# Patient Record
Sex: MALE | Race: WHITE | Employment: OTHER | URBAN - METROPOLITAN AREA
[De-identification: names, ages, dates, MRNs, and addresses within clinical notes are randomized per-mention and may not be internally consistent; named-entity substitution may affect disease eponyms.]

---

## 2021-07-16 ENCOUNTER — HOSPITAL ENCOUNTER (EMERGENCY)
Age: 41
Discharge: HOME | End: 2021-07-16
Payer: MEDICAID

## 2021-07-16 VITALS
HEART RATE: 78 BPM | WEIGHT: 255 LBS | HEIGHT: 75 IN | OXYGEN SATURATION: 98 % | RESPIRATION RATE: 16 BRPM | DIASTOLIC BLOOD PRESSURE: 96 MMHG | BODY MASS INDEX: 31.71 KG/M2 | SYSTOLIC BLOOD PRESSURE: 124 MMHG | TEMPERATURE: 98.7 F

## 2021-07-16 DIAGNOSIS — L72.3 INFECTED SEBACEOUS CYST OF SKIN: Primary | ICD-10-CM

## 2021-07-16 DIAGNOSIS — L08.9 INFECTED SEBACEOUS CYST OF SKIN: Primary | ICD-10-CM

## 2021-07-16 PROCEDURE — 6370000000 HC RX 637 (ALT 250 FOR IP): Performed by: NURSE PRACTITIONER

## 2021-07-16 PROCEDURE — 99283 EMERGENCY DEPT VISIT LOW MDM: CPT

## 2021-07-16 RX ORDER — DOXYCYCLINE HYCLATE 100 MG
100 TABLET ORAL 2 TIMES DAILY
Qty: 20 TABLET | Refills: 0 | Status: SHIPPED | OUTPATIENT
Start: 2021-07-16 | End: 2021-07-26

## 2021-07-16 RX ORDER — OMEPRAZOLE 40 MG/1
40 CAPSULE, DELAYED RELEASE ORAL DAILY
COMMUNITY

## 2021-07-16 RX ORDER — ATORVASTATIN CALCIUM 10 MG/1
10 TABLET, FILM COATED ORAL DAILY
COMMUNITY

## 2021-07-16 RX ORDER — MONTELUKAST SODIUM 10 MG/1
10 TABLET ORAL NIGHTLY
COMMUNITY

## 2021-07-16 RX ORDER — IBUPROFEN 800 MG/1
800 TABLET ORAL EVERY 6 HOURS PRN
Qty: 20 TABLET | Refills: 0 | Status: SHIPPED | OUTPATIENT
Start: 2021-07-16 | End: 2021-10-28

## 2021-07-16 RX ORDER — LIDOCAINE HYDROCHLORIDE 10 MG/ML
20 INJECTION, SOLUTION INFILTRATION; PERINEURAL ONCE
Status: DISCONTINUED | OUTPATIENT
Start: 2021-07-16 | End: 2021-07-16 | Stop reason: HOSPADM

## 2021-07-16 RX ORDER — DOXYCYCLINE HYCLATE 100 MG/1
100 CAPSULE ORAL ONCE
Status: COMPLETED | OUTPATIENT
Start: 2021-07-16 | End: 2021-07-16

## 2021-07-16 RX ADMIN — DOXYCYCLINE HYCLATE 100 MG: 100 CAPSULE ORAL at 15:16

## 2021-07-16 ASSESSMENT — PAIN SCALES - GENERAL: PAINLEVEL_OUTOF10: 5

## 2021-07-16 ASSESSMENT — PAIN DESCRIPTION - FREQUENCY: FREQUENCY: CONTINUOUS

## 2021-07-16 ASSESSMENT — PAIN DESCRIPTION - LOCATION: LOCATION: BACK

## 2021-07-16 ASSESSMENT — PAIN DESCRIPTION - DESCRIPTORS: DESCRIPTORS: SHARP

## 2021-07-16 NOTE — ED PROVIDER NOTES
ATTENDING PROVIDER ATTESTATION:     Supervising Physician, on-site, available for consultation, non-participatory in the evaluation or care of this patient     [facility]  Department of Emergency Medicine   ED  Encounter Note  Admit Date/RoomTime: 2021  2:31 PM  ED Room:     NAME: Gerardo Hammer  : 1980  MRN: [id number]     Chief Complaint:  Abscess (to back-  girlfriend drained it at home-yesterday)    History of Present Illness        Gerardo Hammer is a 41 y.o. old male who presents to the emergency department by private vehicle, for gradual onset tender area on right posterior thorax and states he has had a bump on the back for 10 or 15 years and last night his girlfriend tried to pop it and pus came out and he has surrounding erythema. He states his tetanus was updated 3 years ago. Since onset the symptoms have been persistent and gradually worsening, associated with surrounding erythema and tenderness and mild-moderate in severity. He has a history of no prior episodes. ROS   Pertinent positives and negatives are stated within HPI, all other systems reviewed and are negative. Past Medical History:  has a past medical history of Anxiety and depression, GERD (gastroesophageal reflux disease), Hyperlipidemia, and Prediabetes. Surgical History:  has no past surgical history on file. Social History:  reports that he has never smoked. He has never used smokeless tobacco. He reports that he does not drink alcohol. Family History: family history is not on file. Allergies: Pcn [penicillins]    Physical Exam   Oxygen Saturation Interpretation: Normal.        ED Triage Vitals [21 1422]   BP Temp Temp src Pulse Resp SpO2 Height Weight   (!) 124/96 98.7 °F (37.1 °C) -- 78 16 98 % 6' 3\" (1.905 m) 255 lb (115.7 kg)         Constitutional:  Alert, development consistent with age. HEENT:  NC/NT. Airway patent  Neck:  Normal ROM. Supple. Respiratory:  Clear to auscultation and breath sounds equal.  CV:  Regular rate and rhythm, normal heart sounds, without pathological murmurs, ectopy, gallops, or rubs. GI:  Abdomen Soft, nontender, good bowel sounds. No firm or pulsatile mass. Back:  No costovertebral tenderness. Extremities: No tenderness or edema noted. Integument:  Normal turgor. Warm, dry. See photo below  Lymphatics: No lymphangitis or adenopathy noted. Neurological:  Oriented. Motor functions intact. **Informed Consent**    The patient has given verbal consent to have photos taken of thorax and inserted into their ED Provider Note as part of their permanent medical record for purposes of documentation, treatment management and/or medical review. All Images taken on 7/16/21 of patient name: Cathi Meter were transmitted and stored on secured 3Nod located within [employer] Tab by a registered [employer] Mobile Application Device. 5 cm x 10 cm erythema with fluctuance centrally    Lab / Imaging Results   (All laboratory and radiology results have been personally reviewed by myself)  Labs:  No results found for this visit on 07/16/21. Imaging: All Radiology results interpreted by Radiologist unless otherwise noted. No orders to display       ED Course / Medical Decision Making     Medications   lidocaine 1 % injection 20 mL (has no administration in time range)   doxycycline hyclate (VIBRAMYCIN) capsule 100 mg (100 mg Oral Given 7/16/21 1516)        Consult(s):   None    Procedure(s):  PROCEDURE  7/16/21       Time: 1540    INCISION AND DRAINAGE  Risks, benefits and alternatives (for applicable procedures below) described. Performed By: MUSA Jacobs CNP. Indication: Abscess located on right posterior thorax.   Informed consent obtained: The patient was counseled regarding the procedure, it's indications, risks, potential complications and alternatives and any questions were answered. Consent was obtained. .  Prep: The skin was irrigated with sterile saline, cleansed with povidone iodine and draped in a sterile fashion. Local Anesthesia:  obtained with Lidocaine 1% without epinephrine. Incision: The infected epidermal inclusion cyst was Incised by scalpal and moderate, thick sebaceous gray, purulent fluid was drained. A wound culture was not obtained. The wound  was irrigated and was packed with iodoform gauze. The wound was then covered with a sterile dressing. Patient tolerated the procedure well. MDM:      Patient presents today with an infected sebaceous cyst which was successfully drained and wound packing placed. Patient advised to remove the wound packing within the next 48 hours do not leave it any longer. He will be given a surgical consult for reevaluation. Patient was started on doxycycline in the ED and advised to avoid any sun exposure while taking antibiotic. Plan is for treatment with analgesics, ATB's and appropriate outpatient follow-up. Patient is urged to take all ATB to completion unless and adverse reaction develops. Patient advised on signs and symptoms warranting immediate return to the ED for reevaluation and follow-up in the next few days. .    Plan of Care/Counseling:  MUSA Baker CNP reviewed today's visit with the patient in addition to providing specific details for the plan of care and counseling regarding the diagnosis and prognosis. Questions are answered at this time and are agreeable with the plan. Assessment      1.  Infected sebaceous cyst of skin      Plan   Discharged home  Patient condition is good    New Medications     New Prescriptions    DOXYCYCLINE HYCLATE (VIBRA-TABS) 100 MG TABLET    Take 1 tablet by mouth 2 times daily for 10 days    IBUPROFEN (ADVIL;MOTRIN) 800 MG TABLET    Take 1 tablet by mouth every 6 hours as needed for Pain     Electronically signed by MUSA Baker CNP   DD: 7/16/21  **This report was transcribed using voice recognition software. Every effort was made to ensure accuracy; however, inadvertent computerized transcription errors may be present.   END OF ED PROVIDER NOTE      MUSA Moreno - CNP  07/18/21 0871       Tran Solorzano MD  07/20/21 5796

## 2021-07-19 ENCOUNTER — TELEPHONE (OUTPATIENT)
Dept: SURGERY | Age: 41
End: 2021-07-19

## 2021-07-19 NOTE — TELEPHONE ENCOUNTER
GRACE Cunningham received a call from patient wanting to schedule an appointment. MA scheduled pt for 7/29/21 @ 3:30pm with  in [facility] clinic. Patient verbalized appointment date, time and location. MA verified number that was good to do reminder call on was the one listed in chart. MA advised patient where to park and enter in the building for the appointment.     Electronically signed by Darleen Rocha MA on 7/19/21 at 1:57 PM EDT

## 2021-07-29 ENCOUNTER — PREP FOR PROCEDURE (OUTPATIENT)
Dept: SURGERY | Age: 41
End: 2021-07-29

## 2021-07-29 ENCOUNTER — OFFICE VISIT (OUTPATIENT)
Dept: SURGERY | Age: 41
End: 2021-07-29
Payer: MEDICAID

## 2021-07-29 VITALS
TEMPERATURE: 97.2 F | DIASTOLIC BLOOD PRESSURE: 86 MMHG | BODY MASS INDEX: 33.07 KG/M2 | RESPIRATION RATE: 16 BRPM | WEIGHT: 266 LBS | HEART RATE: 72 BPM | OXYGEN SATURATION: 95 % | HEIGHT: 75 IN | SYSTOLIC BLOOD PRESSURE: 129 MMHG

## 2021-07-29 DIAGNOSIS — K21.9 GASTROESOPHAGEAL REFLUX DISEASE, UNSPECIFIED WHETHER ESOPHAGITIS PRESENT: ICD-10-CM

## 2021-07-29 DIAGNOSIS — L72.0 INFECTED EPIDERMOID CYST: Primary | ICD-10-CM

## 2021-07-29 DIAGNOSIS — F32.A ANXIETY AND DEPRESSION: ICD-10-CM

## 2021-07-29 DIAGNOSIS — L08.9 INFECTED EPIDERMOID CYST: Primary | ICD-10-CM

## 2021-07-29 DIAGNOSIS — E11.9 DIABETES MELLITUS TYPE 2, NONINSULIN DEPENDENT (HCC): ICD-10-CM

## 2021-07-29 DIAGNOSIS — F41.9 ANXIETY AND DEPRESSION: ICD-10-CM

## 2021-07-29 DIAGNOSIS — E78.5 HYPERLIPIDEMIA, UNSPECIFIED HYPERLIPIDEMIA TYPE: ICD-10-CM

## 2021-07-29 PROCEDURE — G8417 CALC BMI ABV UP PARAM F/U: HCPCS | Performed by: SURGERY

## 2021-07-29 PROCEDURE — 3046F HEMOGLOBIN A1C LEVEL >9.0%: CPT | Performed by: SURGERY

## 2021-07-29 PROCEDURE — 99203 OFFICE O/P NEW LOW 30 MIN: CPT | Performed by: SURGERY

## 2021-07-29 PROCEDURE — 2022F DILAT RTA XM EVC RTNOPTHY: CPT | Performed by: SURGERY

## 2021-07-29 PROCEDURE — 1036F TOBACCO NON-USER: CPT | Performed by: SURGERY

## 2021-07-29 PROCEDURE — G8427 DOCREV CUR MEDS BY ELIG CLIN: HCPCS | Performed by: SURGERY

## 2021-07-29 RX ORDER — DEXAMETHASONE SODIUM PHOSPHATE 4 MG/ML
8 INJECTION, SOLUTION INTRA-ARTICULAR; INTRALESIONAL; INTRAMUSCULAR; INTRAVENOUS; SOFT TISSUE ONCE
Status: CANCELLED | OUTPATIENT
Start: 2021-07-29 | End: 2021-07-29

## 2021-07-29 RX ORDER — SODIUM CHLORIDE 9 MG/ML
25 INJECTION, SOLUTION INTRAVENOUS PRN
Status: CANCELLED | OUTPATIENT
Start: 2021-07-29

## 2021-07-29 RX ORDER — KETOROLAC TROMETHAMINE 30 MG/ML
30 INJECTION, SOLUTION INTRAMUSCULAR; INTRAVENOUS ONCE
Status: CANCELLED | OUTPATIENT
Start: 2021-07-29 | End: 2021-07-29

## 2021-07-29 RX ORDER — SODIUM CHLORIDE 0.9 % (FLUSH) 0.9 %
10 SYRINGE (ML) INJECTION EVERY 12 HOURS SCHEDULED
Status: CANCELLED | OUTPATIENT
Start: 2021-07-29

## 2021-07-29 RX ORDER — SODIUM CHLORIDE 0.9 % (FLUSH) 0.9 %
10 SYRINGE (ML) INJECTION PRN
Status: CANCELLED | OUTPATIENT
Start: 2021-07-29

## 2021-07-29 RX ORDER — ACETAMINOPHEN 500 MG
1000 TABLET ORAL ONCE
Status: CANCELLED | OUTPATIENT
Start: 2021-07-29 | End: 2021-07-29

## 2021-07-29 RX ORDER — SODIUM CHLORIDE 9 MG/ML
INJECTION, SOLUTION INTRAVENOUS CONTINUOUS
Status: CANCELLED | OUTPATIENT
Start: 2021-07-29

## 2021-07-29 NOTE — LETTER
[address]  [address], [place], [address]  [phone] (Fax)    July 29, 2021     Caitlin Yun MD  [address] [address]    Patient: Garcia Tucker   YOB: 1980   Date of Visit: 7/29/2021       Dear Caitlin Yun:    Thank you for referring Garcia Tucker to me for evaluation. Attached is my office note. If you have questions, please do not hesitate to call me. I look forward to following this patient along with you. Sincerely,    Electronically signed by Maxx Pierce MD on 7/29/21 at 4:38 PM EDT                                                    History and Physical    Patient's Name/Date of Birth: Garcia Tucker /1980, (41 y.o.), male    Date: July 29, 2021     Assessment/Plan:    Chief Complaint:  Chief Complaint   Patient presents with    Cyst     pt states that he has a cyst/abscess on his back that he went to the ED for on 7/16/21 where they did an I&D, pt finished his antibiotics on monday. pt states that he still has brown drainage coming from wound pt states that he has very little pain. pt denies any fever/chills/nausea or vomiting. HPI:   Cysts right mid back x 15 years was going to remove last year but postopone due to covid      1-2 days pain swelling rednnes wtne tot   7/16/2021 [place] ED I&D rmoved packing after 2 days Doxycylin x 10 days 100 mg bid    No other cysts     Past Medical History:   Diagnosis Date    Anxiety and depression     GERD (gastroesophageal reflux disease)     Hyperlipidemia     Prediabetes        History reviewed. No pertinent surgical history.     Current Outpatient Medications   Medication Sig Dispense Refill    TRAZODONE HCL PO Take 50 mg by mouth       METOPROLOL SUCCINATE PO Take by mouth      SERTRALINE HCL PO Take 150 mg by mouth       METFORMIN HCL PO Take by mouth      montelukast (SINGULAIR) 10 MG tablet Take 10 mg by mouth nightly      atorvastatin (LIPITOR) 10 MG tablet Take 10 mg by mouth daily      omeprazole (PRILOSEC) 40 MG delayed release capsule Take 40 mg by mouth daily      ibuprofen (ADVIL;MOTRIN) 800 MG tablet Take 1 tablet by mouth every 6 hours as needed for Pain 20 tablet 0     No current facility-administered medications for this visit. Allergies   Allergen Reactions    Pcn [Penicillins] Swelling       Review of Systems  Non-contributory    Physical Exam:  Vitals:    07/29/21 1526   BP: 129/86   Site: Right Upper Arm   Position: Sitting   Cuff Size: Large Adult   Pulse: 72   Resp: 16   Temp: 97.2 °F (36.2 °C)   TempSrc: Infrared   SpO2: 95%   Weight: 266 lb (120.7 kg)   Height: 6' 3\" (1.905 m)       Body mass index is 33.25 kg/m². Physical Exam  Constitutional:       Appearance: Normal appearance. He is obese. HENT:      Head: Normocephalic and atraumatic. Cardiovascular:      Rate and Rhythm: Normal rate and regular rhythm. Pulmonary:      Effort: Pulmonary effort is normal.      Breath sounds: Normal breath sounds. Skin:     Comments: 2 x 1 cm epidermal cyst right mid back with small I&D opening over it with no drainag (see picture below)   Neurological:      Mental Status: He is alert.                Electronically signed by Kenn Bourne MD on 7/29/21 at 4:07 PM EDT

## 2021-07-29 NOTE — H&P (VIEW-ONLY)
History and Physical    Patient's Name/Date of Birth: Flaco Abreu /1980, (41 y.o.), male    Date: July 29, 2021     Assessment/Plan:  1. 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 Patient understood and wanted to proceed with the procedure. 2. Type II, non-insulin-dependent, diabetes mellitus without complications  3. Hyperlipidemia - on medications for this  4. GERD - on Prilosec 40 mg p.o. daily for this  5. Anxiety and depression - on medications for this    Chief Complaint:  Chief Complaint   Patient presents with    Cyst     pt states that he has a cyst/abscess on his back that he went to the ED for on 7/16/21 where they did an I&D, pt finished his antibiotics on monday. pt states that he still has brown drainage coming from wound pt states that he has very little pain. pt denies any fever/chills/nausea or vomiting. HPI:   Patient was seen in the office today for the above complaint. He said he has had a cyst in the right mid back for the last 15 years. It had not been causing problems but he did plan on having this removed last year but due to the COVID-19 pandemic this was postponed. He was seen in the [facility] ED on 7/16/2021 with a 1 to 2-day history of progressive pain swelling and redness around the cyst.  He had an incision and drainage performed in the ED. The wound was packed. Patient was discharged on doxycycline 100 mg p.o. twice daily x 10 days. He removed the packing 2 days later and has been doing daily change of the bandage over the I&D site. He never had any fever chills or any systemic signs of infection. With the above treatment infection has been markedly improved.     Currently patient has no pain or tenderness over the area of the I&D site of the cyst.  He denies any other cysts in other parts of the body. Past Medical History:   Diagnosis Date    Anxiety and depression     GERD (gastroesophageal reflux disease)     Hyperlipidemia     Prediabetes        History reviewed. No pertinent surgical history. Current Outpatient Medications   Medication Sig Dispense Refill    TRAZODONE HCL PO Take 50 mg by mouth       METOPROLOL SUCCINATE PO Take by mouth      SERTRALINE HCL PO Take 150 mg by mouth       METFORMIN HCL PO Take by mouth      montelukast (SINGULAIR) 10 MG tablet Take 10 mg by mouth nightly      atorvastatin (LIPITOR) 10 MG tablet Take 10 mg by mouth daily      omeprazole (PRILOSEC) 40 MG delayed release capsule Take 40 mg by mouth daily      ibuprofen (ADVIL;MOTRIN) 800 MG tablet Take 1 tablet by mouth every 6 hours as needed for Pain 20 tablet 0     No current facility-administered medications for this visit. Allergies   Allergen Reactions    Pcn [Penicillins] Swelling       Review of Systems  Non-contributory    Physical Exam:  Vitals:    07/29/21 1526   BP: 129/86   Site: Right Upper Arm   Position: Sitting   Cuff Size: Large Adult   Pulse: 72   Resp: 16   Temp: 97.2 °F (36.2 °C)   TempSrc: Infrared   SpO2: 95%   Weight: 266 lb (120.7 kg)   Height: 6' 3\" (1.905 m)       Body mass index is 33.25 kg/m². Physical Exam  Constitutional:       Appearance: Normal appearance. He is obese. HENT:      Head: Normocephalic and atraumatic. Cardiovascular:      Rate and Rhythm: Normal rate and regular rhythm. Pulmonary:      Effort: Pulmonary effort is normal.      Breath sounds: Normal breath sounds. Skin:     Comments: 2 x 1 cm epidermal cyst right mid back with small I&D opening over it with no drainag (see picture below)   Neurological:      Mental Status: He is alert.          Electronically signed by Jolanta Alcantar MD on 7/29/21 at 4:07 PM EDT

## 2021-07-29 NOTE — H&P
History and Physical    Patient's Name/Date of Birth: Jonh Stanton /1980, (41 y.o.), male    Date: July 29, 2021     Assessment/Plan:  1. 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 Patient understood and wanted to proceed with the procedure. 2. Type II, non-insulin-dependent, diabetes mellitus without complications  3. Hyperlipidemia - on medications for this  4. GERD - on Prilosec 40 mg p.o. daily for this  5. Anxiety and depression - on medications for this    Chief Complaint:  Chief Complaint   Patient presents with    Cyst     pt states that he has a cyst/abscess on his back that he went to the ED for on 7/16/21 where they did an I&D, pt finished his antibiotics on monday. pt states that he still has brown drainage coming from wound pt states that he has very little pain. pt denies any fever/chills/nausea or vomiting. HPI:   Patient was seen in the office today for the above complaint. He said he has had a cyst in the right mid back for the last 15 years. It had not been causing problems but he did plan on having this removed last year but due to the COVID-19 pandemic this was postponed. He was seen in the [facility] ED on 7/16/2021 with a 1 to 2-day history of progressive pain swelling and redness around the cyst.  He had an incision and drainage performed in the ED. The wound was packed. Patient was discharged on doxycycline 100 mg p.o. twice daily x 10 days. He removed the packing 2 days later and has been doing daily change of the bandage over the I&D site. He never had any fever chills or any systemic signs of infection. With the above treatment infection has been markedly improved.     Currently patient has no pain or tenderness over the area of the I&D site of the cyst.  He denies any other cysts in other parts of the body. Past Medical History:   Diagnosis Date    Anxiety and depression     GERD (gastroesophageal reflux disease)     Hyperlipidemia     Prediabetes        History reviewed. No pertinent surgical history. Current Outpatient Medications   Medication Sig Dispense Refill    TRAZODONE HCL PO Take 50 mg by mouth       METOPROLOL SUCCINATE PO Take by mouth      SERTRALINE HCL PO Take 150 mg by mouth       METFORMIN HCL PO Take by mouth      montelukast (SINGULAIR) 10 MG tablet Take 10 mg by mouth nightly      atorvastatin (LIPITOR) 10 MG tablet Take 10 mg by mouth daily      omeprazole (PRILOSEC) 40 MG delayed release capsule Take 40 mg by mouth daily      ibuprofen (ADVIL;MOTRIN) 800 MG tablet Take 1 tablet by mouth every 6 hours as needed for Pain 20 tablet 0     No current facility-administered medications for this visit. Allergies   Allergen Reactions    Pcn [Penicillins] Swelling       Review of Systems  Non-contributory    Physical Exam:  Vitals:    07/29/21 1526   BP: 129/86   Site: Right Upper Arm   Position: Sitting   Cuff Size: Large Adult   Pulse: 72   Resp: 16   Temp: 97.2 °F (36.2 °C)   TempSrc: Infrared   SpO2: 95%   Weight: 266 lb (120.7 kg)   Height: 6' 3\" (1.905 m)       Body mass index is 33.25 kg/m². Physical Exam  Constitutional:       Appearance: Normal appearance. He is obese. HENT:      Head: Normocephalic and atraumatic. Cardiovascular:      Rate and Rhythm: Normal rate and regular rhythm. Pulmonary:      Effort: Pulmonary effort is normal.      Breath sounds: Normal breath sounds. Skin:     Comments: 2 x 1 cm epidermal cyst right mid back with small I&D opening over it with no drainag (see picture below)   Neurological:      Mental Status: He is alert.          Electronically signed by Alma Sandy MD on 7/29/21 at 4:07 PM EDT

## 2021-07-29 NOTE — PROGRESS NOTES
History and Physical    Patient's Name/Date of Birth: Estella Jones /1980, (41 y.o.), male    Date: July 29, 2021     Assessment/Plan:  1. 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 Patient understood and wanted to proceed with the procedure. 2. Type II, non-insulin-dependent, diabetes mellitus without complications  3. Hyperlipidemia - on medications for this  4. GERD - on Prilosec 40 mg p.o. daily for this  5. Anxiety and depression - on medications for this    Chief Complaint:  Chief Complaint   Patient presents with    Cyst     pt states that he has a cyst/abscess on his back that he went to the ED for on 7/16/21 where they did an I&D, pt finished his antibiotics on monday. pt states that he still has brown drainage coming from wound pt states that he has very little pain. pt denies any fever/chills/nausea or vomiting. HPI:   Patient was seen in the office today for the above complaint. He said he has had a cyst in the right mid back for the last 15 years. It had not been causing problems but he did plan on having this removed last year but due to the COVID-19 pandemic this was postponed. He was seen in the [facility] ED on 7/16/2021 with a 1 to 2-day history of progressive pain swelling and redness around the cyst.  He had an incision and drainage performed in the ED. The wound was packed. Patient was discharged on doxycycline 100 mg p.o. twice daily x 10 days. He removed the packing 2 days later and has been doing daily change of the bandage over the I&D site. He never had any fever chills or any systemic signs of infection. With the above treatment infection has been markedly improved.     Currently patient has no pain or tenderness over the area of the I&D site of the cyst.  He denies any other cysts in other parts of the body. Past Medical History:   Diagnosis Date    Anxiety and depression     GERD (gastroesophageal reflux disease)     Hyperlipidemia     Prediabetes        History reviewed. No pertinent surgical history. Current Outpatient Medications   Medication Sig Dispense Refill    TRAZODONE HCL PO Take 50 mg by mouth       METOPROLOL SUCCINATE PO Take by mouth      SERTRALINE HCL PO Take 150 mg by mouth       METFORMIN HCL PO Take by mouth      montelukast (SINGULAIR) 10 MG tablet Take 10 mg by mouth nightly      atorvastatin (LIPITOR) 10 MG tablet Take 10 mg by mouth daily      omeprazole (PRILOSEC) 40 MG delayed release capsule Take 40 mg by mouth daily      ibuprofen (ADVIL;MOTRIN) 800 MG tablet Take 1 tablet by mouth every 6 hours as needed for Pain 20 tablet 0     No current facility-administered medications for this visit. Allergies   Allergen Reactions    Pcn [Penicillins] Swelling       Review of Systems  Non-contributory    Physical Exam:  Vitals:    07/29/21 1526   BP: 129/86   Site: Right Upper Arm   Position: Sitting   Cuff Size: Large Adult   Pulse: 72   Resp: 16   Temp: 97.2 °F (36.2 °C)   TempSrc: Infrared   SpO2: 95%   Weight: 266 lb (120.7 kg)   Height: 6' 3\" (1.905 m)       Body mass index is 33.25 kg/m². Physical Exam  Constitutional:       Appearance: Normal appearance. He is obese. HENT:      Head: Normocephalic and atraumatic. Cardiovascular:      Rate and Rhythm: Normal rate and regular rhythm. Pulmonary:      Effort: Pulmonary effort is normal.      Breath sounds: Normal breath sounds. Skin:     Comments: 2 x 1 cm epidermal cyst right mid back with small I&D opening over it with no drainag (see picture below)   Neurological:      Mental Status: He is alert.          Electronically signed by Kamron Dhillon MD on 7/29/21 at 4:07 PM EDT

## 2021-08-02 ENCOUNTER — TELEPHONE (OUTPATIENT)
Dept: SURGERY | Age: 41
End: 2021-08-02

## 2021-08-02 NOTE — TELEPHONE ENCOUNTER
MA received a call from patient to schedule surgery. MA explained to pt I woill forward the message to GRACE Calvo to contact him to schedule. Patient understood and can be reached at [phone].   Electronically signed by Liliya Andrew on 8/2/21 at 1:46 PM EDT

## 2021-08-03 ENCOUNTER — TELEPHONE (OUTPATIENT)
Dept: SURGERY | Age: 41
End: 2021-08-03

## 2021-08-03 NOTE — TELEPHONE ENCOUNTER
Prior Authorization Form:      DEMOGRAPHICS:                     Patient Name:  Jackie Miranda  Patient :  1980            Insurance:  Payor: Armen Berrios / Plan: Brennagenmelani Berrios / Product Type: *No Product type* /   Insurance ID Number:    Payor/Plan Subscr  Sex Relation Sub.  Ins. ID Effective Group Num   [facility] 1980 [facility] [id number]                                    [id number]         DIAGNOSIS & PROCEDURE:                       Procedure/Operation: EXCISION OF EPIDERMAL CYST           CPT Code: 74393    Diagnosis:  INFECTED EPIDERMOID CYST    ICD10 Code: L72.0    Location:  [address] [place] [address] [place]    Surgeon:  DR. Brian Velasco    SCHEDULING INFORMATION:                          Date: 21    Time: 7:30 AM              Anesthesia:  LMAC                                                       Status:  Outpatient        Special Comments:  N/A       Electronically signed by Delta Resendez on 8/3/2021 at 9:42 AM\

## 2021-08-19 NOTE — PROGRESS NOTES
[facility] PRE-ADMISSION TESTING GENERAL INSTRUCTIONS- Formerly [facility]-phone number:[phone]    GENERAL INSTRUCTIONS  [x] Antibacterial Soap shower Night before and/or AM of Surgery  [] Hamilton wipe instruction sheet and wipes given. [x] Nothing by mouth after midnight, including gum, candy, mints, or water. [x] You may brush your teeth, gargle, but do NOT swallow water. []Hibiclens shower  the night before and the morning of surgery. Do not use             Hibiclens on your face or head. [x]No smoking, chewing tobacco, illegal drugs, or alcohol within 24 hours of your surgery. [x] Jewelry, valuables or body piercing's should not be brought to the hospital. All body and/or tongue piercing's must be removed prior to arriving to hospital.  ALL hair pins must be removed. [x] Do not wear makeup, lotions, powders, deodorant. Nail polish as directed by the nurse. [x] Arrange transportation with a responsible adult  to and from the hospital. If you do not have a responsible adult  to transport you, you will need to make arrangements with a medical transportation company (i.e. [employer]. A Uber/taxi/bus is not appropriate unless you are accompanied by a responsible adult ). Arrange for someone to be with you for the remainder of the day and for 24 hours after your procedure due to having had anesthesia. Who will be your  for transportation?___Luis Eon_______________   Who will be staying with you for 24 hrs after your procedure?__________________  [x] Bring insurance card and photo ID.  [] Transfusion Bracelet: Please bring with you to hospital, day of surgery  [] Bring urine specimen day of surgery. Any small container is acceptable. [x] Use inhalers the morning of surgery and bring with you to hospital.  [] Bring copy of living will or healthcare power of  papers to be placed in your electronic record.   [] CPAP/BI-PAP: Please bring your machine if you are to spend the night in the hospital.     PARKING INSTRUCTIONS:   [x] Arrival Time:___0530__________  · [x] Parking lot '\"I\"  is located on [facility] (the corner of [facility] and [facility]). To enter, press the button and the gate will lift. A free token will be provided to exit the lot. One car per patient is allowed to park in this lot. All other cars are to park on [address] [place] [address] either in the parking garage or the handicap lot. [] To reach the [facility] lobby from [address] [place] [address], upon entering the hospital, take elevator B to the 3rd floor. EDUCATION INSTRUCTIONS:      [] Knee or hip replacement booklet & exercise pamphlets given. [] Laisha 75 placed in chart. [] Pre-admission Testing educational folder given  [] Incentive Spirometry,coughing & deep breathing exercises reviewed. []Medication information sheet(s)   []Fluoroscopy-Xray used in surgery reviewed with patient. Educational pamphlet placed in chart. []Pain: Post-op pain is normal and to be expected. You will be asked to rate your pain from 0-10(a zero is not acceptable-education is needed). Your post-op pain goal is:  [] Ask your nurse for your pain medication. [] Joint camp offered. [] Joint replacement booklets given. [] Other:___________________________    MEDICATION INSTRUCTIONS:   [x]Bring a complete list of your medications, please write the last time you took the medicine, give this list to the nurse. [x] Take the following medications the morning of surgery with 1-2 ounces of water:  Sertraline, metoprolol, prilosec, albuterol if needed   [x] Stop herbal supplements and vitamins 5 days before your surgery. [x] DO NOT take any diabetic medicine the morning of surgery. Follow instructions for insulin the day before surgery. [x] If you are diabetic and your blood sugar is low or you feel symptomatic, you may drink 1-2 ounces of apple juice or take a glucose tablet.   The morning of your procedure, you may call the pre-op area if you have concerns about your blood sugar [phone]. [x] Use your inhalers the morning of surgery. Bring your emergency inhaler with you day of surgery. [x] Follow physician instructions regarding any blood thinners you may be taking. WHAT TO EXPECT:  [x] The day of surgery you will be greeted and checked in by the [employer].  In addition, you will be registered in the [facility] by a Patient Access Representative. Please bring your photo ID and insurance card. A nurse will greet you in accordance to the time you are needed in the pre-op area to prepare you for surgery. Please do not be discouraged if you are not greeted in the order you arrive as there are many variables that are involved in patient preparation. Your patience is greatly appreciated as you wait for your nurse. Please bring in items such as: books, magazines, newspapers, electronics, or any other items  to occupy your time in the waiting area. [x]  Delays may occur with surgery and staff will make a sincere effort to keep you informed of delays. If any delays occur with your procedure, we apologize ahead of time for your inconvenience as we recognize the value of your time.

## 2021-08-23 ENCOUNTER — ANESTHESIA EVENT (OUTPATIENT)
Dept: OPERATING ROOM | Age: 41
End: 2021-08-23
Payer: MEDICAID

## 2021-08-23 ENCOUNTER — HOSPITAL ENCOUNTER (OUTPATIENT)
Age: 41
Setting detail: OUTPATIENT SURGERY
Discharge: HOME | End: 2021-08-23
Attending: SURGERY | Admitting: SURGERY
Payer: MEDICAID

## 2021-08-23 ENCOUNTER — ANESTHESIA (OUTPATIENT)
Dept: OPERATING ROOM | Age: 41
End: 2021-08-23
Payer: MEDICAID

## 2021-08-23 VITALS — DIASTOLIC BLOOD PRESSURE: 53 MMHG | SYSTOLIC BLOOD PRESSURE: 81 MMHG | OXYGEN SATURATION: 97 %

## 2021-08-23 VITALS
WEIGHT: 260 LBS | HEART RATE: 61 BPM | TEMPERATURE: 97 F | SYSTOLIC BLOOD PRESSURE: 105 MMHG | HEIGHT: 75 IN | OXYGEN SATURATION: 93 % | DIASTOLIC BLOOD PRESSURE: 64 MMHG | RESPIRATION RATE: 18 BRPM | BODY MASS INDEX: 32.33 KG/M2

## 2021-08-23 DIAGNOSIS — Z01.812 PRE-OPERATIVE LABORATORY EXAMINATION: Primary | ICD-10-CM

## 2021-08-23 LAB — METER GLUCOSE: 100 MG/DL (ref 74–99)

## 2021-08-23 PROCEDURE — 2500000003 HC RX 250 WO HCPCS: Performed by: SURGERY

## 2021-08-23 PROCEDURE — 3600000012 HC SURGERY LEVEL 2 ADDTL 15MIN: Performed by: SURGERY

## 2021-08-23 PROCEDURE — 2580000003 HC RX 258: Performed by: SURGERY

## 2021-08-23 PROCEDURE — 6360000002 HC RX W HCPCS: Performed by: SURGERY

## 2021-08-23 PROCEDURE — 6370000000 HC RX 637 (ALT 250 FOR IP): Performed by: SURGERY

## 2021-08-23 PROCEDURE — 2580000003 HC RX 258: Performed by: NURSE ANESTHETIST, CERTIFIED REGISTERED

## 2021-08-23 PROCEDURE — 7100000010 HC PHASE II RECOVERY - FIRST 15 MIN: Performed by: SURGERY

## 2021-08-23 PROCEDURE — 82962 GLUCOSE BLOOD TEST: CPT

## 2021-08-23 PROCEDURE — 3600000002 HC SURGERY LEVEL 2 BASE: Performed by: SURGERY

## 2021-08-23 PROCEDURE — 3700000000 HC ANESTHESIA ATTENDED CARE: Performed by: SURGERY

## 2021-08-23 PROCEDURE — 2709999900 HC NON-CHARGEABLE SUPPLY: Performed by: SURGERY

## 2021-08-23 PROCEDURE — 7100000011 HC PHASE II RECOVERY - ADDTL 15 MIN: Performed by: SURGERY

## 2021-08-23 PROCEDURE — 3700000001 HC ADD 15 MINUTES (ANESTHESIA): Performed by: SURGERY

## 2021-08-23 PROCEDURE — 88304 TISSUE EXAM BY PATHOLOGIST: CPT

## 2021-08-23 PROCEDURE — 6360000002 HC RX W HCPCS: Performed by: NURSE ANESTHETIST, CERTIFIED REGISTERED

## 2021-08-23 PROCEDURE — 2500000003 HC RX 250 WO HCPCS: Performed by: NURSE ANESTHETIST, CERTIFIED REGISTERED

## 2021-08-23 PROCEDURE — 11402 EXC TR-EXT B9+MARG 1.1-2 CM: CPT | Performed by: SURGERY

## 2021-08-23 RX ORDER — SODIUM CHLORIDE 9 MG/ML
INJECTION, SOLUTION INTRAVENOUS CONTINUOUS
Status: DISCONTINUED | OUTPATIENT
Start: 2021-08-23 | End: 2021-08-23 | Stop reason: HOSPADM

## 2021-08-23 RX ORDER — LIDOCAINE 4 G/G
1 PATCH TOPICAL EVERY 12 HOURS
Qty: 1 BOX | Refills: 0 | Status: SHIPPED | OUTPATIENT
Start: 2021-08-23 | End: 2021-10-28

## 2021-08-23 RX ORDER — MIDAZOLAM HYDROCHLORIDE 1 MG/ML
INJECTION INTRAMUSCULAR; INTRAVENOUS PRN
Status: DISCONTINUED | OUTPATIENT
Start: 2021-08-23 | End: 2021-08-23 | Stop reason: SDUPTHER

## 2021-08-23 RX ORDER — PHENYLEPHRINE HCL IN 0.9% NACL 1 MG/10 ML
SYRINGE (ML) INTRAVENOUS PRN
Status: DISCONTINUED | OUTPATIENT
Start: 2021-08-23 | End: 2021-08-23 | Stop reason: SDUPTHER

## 2021-08-23 RX ORDER — ACETAMINOPHEN 500 MG
TABLET ORAL
Qty: 100 TABLET | Refills: 0 | Status: SHIPPED | OUTPATIENT
Start: 2021-08-23 | End: 2021-10-28

## 2021-08-23 RX ORDER — FENTANYL CITRATE 50 UG/ML
INJECTION, SOLUTION INTRAMUSCULAR; INTRAVENOUS PRN
Status: DISCONTINUED | OUTPATIENT
Start: 2021-08-23 | End: 2021-08-23 | Stop reason: SDUPTHER

## 2021-08-23 RX ORDER — LIDOCAINE HYDROCHLORIDE AND EPINEPHRINE 10; 10 MG/ML; UG/ML
INJECTION, SOLUTION INFILTRATION; PERINEURAL PRN
Status: DISCONTINUED | OUTPATIENT
Start: 2021-08-23 | End: 2021-08-23 | Stop reason: ALTCHOICE

## 2021-08-23 RX ORDER — SODIUM CHLORIDE 0.9 % (FLUSH) 0.9 %
10 SYRINGE (ML) INJECTION EVERY 12 HOURS SCHEDULED
Status: DISCONTINUED | OUTPATIENT
Start: 2021-08-23 | End: 2021-08-23 | Stop reason: HOSPADM

## 2021-08-23 RX ORDER — DEXAMETHASONE SODIUM PHOSPHATE 4 MG/ML
8 INJECTION, SOLUTION INTRA-ARTICULAR; INTRALESIONAL; INTRAMUSCULAR; INTRAVENOUS; SOFT TISSUE ONCE
Status: COMPLETED | OUTPATIENT
Start: 2021-08-23 | End: 2021-08-23

## 2021-08-23 RX ORDER — KETOROLAC TROMETHAMINE 30 MG/ML
30 INJECTION, SOLUTION INTRAMUSCULAR; INTRAVENOUS ONCE
Status: COMPLETED | OUTPATIENT
Start: 2021-08-23 | End: 2021-08-23

## 2021-08-23 RX ORDER — PROPOFOL 10 MG/ML
INJECTION, EMULSION INTRAVENOUS CONTINUOUS PRN
Status: DISCONTINUED | OUTPATIENT
Start: 2021-08-23 | End: 2021-08-23 | Stop reason: SDUPTHER

## 2021-08-23 RX ORDER — SODIUM CHLORIDE 9 MG/ML
INJECTION, SOLUTION INTRAVENOUS CONTINUOUS PRN
Status: DISCONTINUED | OUTPATIENT
Start: 2021-08-23 | End: 2021-08-23 | Stop reason: SDUPTHER

## 2021-08-23 RX ORDER — SODIUM CHLORIDE 0.9 % (FLUSH) 0.9 %
10 SYRINGE (ML) INJECTION PRN
Status: DISCONTINUED | OUTPATIENT
Start: 2021-08-23 | End: 2021-08-23 | Stop reason: HOSPADM

## 2021-08-23 RX ORDER — ACETAMINOPHEN 500 MG
1000 TABLET ORAL ONCE
Status: COMPLETED | OUTPATIENT
Start: 2021-08-23 | End: 2021-08-23

## 2021-08-23 RX ORDER — SODIUM CHLORIDE 9 MG/ML
25 INJECTION, SOLUTION INTRAVENOUS PRN
Status: DISCONTINUED | OUTPATIENT
Start: 2021-08-23 | End: 2021-08-23 | Stop reason: HOSPADM

## 2021-08-23 RX ADMIN — FENTANYL CITRATE 50 MCG: 50 INJECTION, SOLUTION INTRAMUSCULAR; INTRAVENOUS at 07:28

## 2021-08-23 RX ADMIN — KETOROLAC TROMETHAMINE 30 MG: 30 INJECTION, SOLUTION INTRAMUSCULAR at 06:54

## 2021-08-23 RX ADMIN — SODIUM CHLORIDE: 9 INJECTION, SOLUTION INTRAVENOUS at 06:44

## 2021-08-23 RX ADMIN — ACETAMINOPHEN 1000 MG: 500 TABLET ORAL at 06:54

## 2021-08-23 RX ADMIN — CEFAZOLIN 3000 MG: 1 INJECTION, POWDER, FOR SOLUTION INTRAMUSCULAR; INTRAVENOUS; PARENTERAL at 07:34

## 2021-08-23 RX ADMIN — MIDAZOLAM 2 MG: 1 INJECTION INTRAMUSCULAR; INTRAVENOUS at 07:23

## 2021-08-23 RX ADMIN — Medication 100 MCG: at 07:41

## 2021-08-23 RX ADMIN — PROPOFOL 75 MCG/KG/MIN: 10 INJECTION, EMULSION INTRAVENOUS at 07:28

## 2021-08-23 RX ADMIN — DEXAMETHASONE SODIUM PHOSPHATE 8 MG: 4 INJECTION, SOLUTION INTRAMUSCULAR; INTRAVENOUS at 06:54

## 2021-08-23 RX ADMIN — FENTANYL CITRATE 50 MCG: 50 INJECTION, SOLUTION INTRAMUSCULAR; INTRAVENOUS at 07:39

## 2021-08-23 RX ADMIN — SODIUM CHLORIDE: 9 INJECTION, SOLUTION INTRAVENOUS at 07:23

## 2021-08-23 ASSESSMENT — PAIN SCALES - GENERAL
PAINLEVEL_OUTOF10: 0

## 2021-08-23 ASSESSMENT — PULMONARY FUNCTION TESTS
PIF_VALUE: 30
PIF_VALUE: 33
PIF_VALUE: 29
PIF_VALUE: 0
PIF_VALUE: 23
PIF_VALUE: 0
PIF_VALUE: 0
PIF_VALUE: 3
PIF_VALUE: 5
PIF_VALUE: 2
PIF_VALUE: 0
PIF_VALUE: 1
PIF_VALUE: 35
PIF_VALUE: 1

## 2021-08-23 ASSESSMENT — PAIN - FUNCTIONAL ASSESSMENT: PAIN_FUNCTIONAL_ASSESSMENT: 0-10

## 2021-08-23 NOTE — ANESTHESIA POSTPROCEDURE EVALUATION
Department of Anesthesiology  Postprocedure Note    Patient: Hortencia Rhodes  MRN: [id number]  [place]: 1980  Date of evaluation: 8/23/2021  Time:  7:20 PM     Procedure Summary     Date: 08/23/21 Room / Location: [place] [address] / [facility]    Anesthesia Start: 5739 Anesthesia Stop: 5855    Procedure: EXCISION OF EPIDERMAL CYST RIGHT MID BACK (Right Back) Diagnosis: (EPIDERMAL CYST)    Surgeons: Jolanta Alcantar MD Responsible Provider: Essie Cruz MD    Anesthesia Type: MAC ASA Status: 3          Anesthesia Type: MAC    Giuliana Phase I: Giuliana Score: 10    Giuliana Phase II: Giuliana Score: 10    Last vitals: Reviewed and per EMR flowsheets.        Anesthesia Post Evaluation    Patient location during evaluation: PACU  Patient participation: complete - patient participated  Level of consciousness: awake and alert  Airway patency: patent  Nausea & Vomiting: no nausea and no vomiting  Complications: no  Cardiovascular status: hemodynamically stable and blood pressure returned to baseline  Respiratory status: acceptable  Hydration status: euvolemic

## 2021-08-23 NOTE — INTERVAL H&P NOTE
Update History & Physical    The patient's History and Physical of July 29, 2021 was reviewed with the patient and I examined the patient. There was no change. The surgical site was confirmed by the patient and me. Plan: The risks, benefits, expected outcome, and alternative to the recommended procedure have been discussed with the patient. Patient understands and wants to proceed with the procedure.      Electronically signed by Santiago Newman MD on 8/23/2021 at 7:12 AM

## 2021-08-23 NOTE — PROGRESS NOTES
Discharge instructions reviewed with patient, including post anesthesia care at home. Patient verbalizes understanding, no questions regarding instructions.

## 2021-08-23 NOTE — ANESTHESIA PRE PROCEDURE
Department of Anesthesiology  Preprocedure Note       Name:  Curly Cassidy   Age:  41 y.o.  :  1980                                          MRN:  [id number]         Date:  2021      Surgeon: Harpreet Rdz):  Mellissa Mas MD    Procedure: Procedure(s):  EXCISION OF EPIDERMAL CYST RIGHT MID BACK    Medications prior to admission:   Prior to Admission medications    Medication Sig Start Date End Date Taking?  Authorizing Provider   METOPROLOL SUCCINATE PO Take by mouth   Yes Historical Provider, MD   SERTRALINE HCL PO Take 150 mg by mouth    Yes Historical Provider, MD   METFORMIN HCL PO Take 500 mg by mouth daily    Yes Historical Provider, MD   montelukast (SINGULAIR) 10 MG tablet Take 10 mg by mouth nightly   Yes Historical Provider, MD   atorvastatin (LIPITOR) 10 MG tablet Take 10 mg by mouth daily   Yes Historical Provider, MD   omeprazole (PRILOSEC) 40 MG delayed release capsule Take 40 mg by mouth daily   Yes Historical Provider, MD   ALBUTEROL SULFATE IN Inhale into the lungs    Historical Provider, MD   TRAZODONE HCL PO Take 50 mg by mouth nightly     Historical Provider, MD   ibuprofen (ADVIL;MOTRIN) 800 MG tablet Take 1 tablet by mouth every 6 hours as needed for Pain 21  Jeff Brunner APRN - CNP       Current medications:    Current Facility-Administered Medications   Medication Dose Route Frequency Provider Last Rate Last Admin    0.9 % sodium chloride infusion  25 mL Intravenous PRN Mellissa Mas MD        0.9 % sodium chloride infusion   Intravenous Continuous Mellissa Mas  mL/hr at 21 0644 New Bag at 21 0644    ceFAZolin (ANCEF) 3,000 mg in dextrose 5 % 100 mL IVPB  3,000 mg Intravenous On Call to Frannie Lopez MD        sodium chloride flush 0.9 % injection 10 mL  10 mL Intravenous 2 times per day Mellissa Mas MD        sodium chloride flush 0.9 % injection 10 mL  10 mL Intravenous PRN Mellissa Mas MD           Allergies: Allergies   Allergen Reactions    Pcn [Penicillins] Swelling       Problem List:    Patient Active Problem List   Diagnosis Code    Hyperlipidemia E78.5    Diabetes mellitus type 2, noninsulin dependent ([facility] E11.9    GERD (gastroesophageal reflux disease) K21.9    Anxiety and depression F41.9, F32.9       Past Medical History:        Diagnosis Date    Anxiety and depression     Diabetes mellitus type 2, noninsulin dependent ([facility]     GERD (gastroesophageal reflux disease)     Hyperlipidemia     Prediabetes        Past Surgical History:  History reviewed. No pertinent surgical history. Social History:    Social History     Tobacco Use    Smoking status: Never Smoker    Smokeless tobacco: Never Used   Substance Use Topics    Alcohol use: Never     Comment: rare                                Counseling given: Not Answered      Vital Signs (Current):   Vitals:    08/19/21 1608 08/23/21 0613   BP:  129/84   Pulse:  76   Resp:  18   Temp:  36.3 °C (97.4 °F)   TempSrc:  Temporal   SpO2:  97%   Weight: 260 lb (117.9 kg) 260 lb (117.9 kg)   Height: 6' 3\" (1.905 m) 6' 3\" (1.905 m)                                              BP Readings from Last 3 Encounters:   08/23/21 129/84   07/29/21 129/86   07/16/21 (!) 124/96       NPO Status: Time of last liquid consumption: 2300                        Time of last solid consumption: 2000                        Date of last liquid consumption: 08/22/21                        Date of last solid food consumption: 08/22/21    BMI:   Wt Readings from Last 3 Encounters:   08/23/21 260 lb (117.9 kg)   07/29/21 266 lb (120.7 kg)   07/16/21 255 lb (115.7 kg)     Body mass index is 32.5 kg/m².     CBC: No results found for: WBC, RBC, HGB, HCT, MCV, RDW, PLT    CMP: No results found for: NA, K, CL, CO2, BUN, CREATININE, GFRAA, AGRATIO, LABGLOM, GLUCOSE, PROT, CALCIUM, BILITOT, ALKPHOS, AST, ALT    POC Tests: No results for input(s): POCGLU, POCNA, POCK, POCCL, POCBUN, Wander Rivera in the last 72 hours. Coags: No results found for: PROTIME, INR, APTT    HCG (If Applicable): No results found for: PREGTESTUR, PREGSERUM, HCG, HCGQUANT     ABGs: No results found for: PHART, PO2ART, SWH5THD, OHL9GJQ, BEART, L5HLJNYU     Type & Screen (If Applicable):  No results found for: LABABO, LABRH    Drug/Infectious Status (If Applicable):  No results found for: HIV, HEPCAB    COVID-19 Screening (If Applicable): No results found for: COVID19        Anesthesia Evaluation  Patient summary reviewed and Nursing notes reviewed no history of anesthetic complications:   Airway: Mallampati: II  TM distance: >3 FB   Neck ROM: full  Mouth opening: > = 3 FB Dental: normal exam     Comment: Denies missing loose or chipped teeth    Pulmonary:Negative Pulmonary ROS and normal exam  breath sounds clear to auscultation                             Cardiovascular:  Exercise tolerance: good (>4 METS),   (+) hyperlipidemia        Rhythm: regular  Rate: normal           Beta Blocker:  Not on Beta Blocker         Neuro/Psych:   (+) psychiatric history:            GI/Hepatic/Renal:   (+) GERD:,           Endo/Other:    (+) DiabetesType II DM, , .                 Abdominal:             Vascular: negative vascular ROS. Other Findings:             Anesthesia Plan      MAC     ASA 3       Induction: intravenous. Anesthetic plan and risks discussed with patient. Plan discussed with attending. MUSA Pineda CRNA   8/23/2021    DOS STAFF ADDENDUM:    Patient seen and chart reviewed. Physical exam and history updated as indicated. NPO status confirmed. Anesthesia options and plan discussed including risks benefits with patient/legal guardian and family as available. Concerns and questions addressed. Consent verbalized to proceed.   Anesthesia plan, options and intraoperative/postoperative concerns discussed with care team.    Gareth Zhu MD, MD  8/23/2021  7:16 AM

## 2021-08-23 NOTE — OP NOTE
Operative Note      Patient: Lazarus Hess  YOB: 1980  MRN: [id number]    Date of Procedure: 8/23/2021    Pre-Op Diagnosis: EPIDERMAL CYST    Post-Op Diagnosis: Same       Procedure(s):  EXCISION OF EPIDERMAL CYST RIGHT MID BACK    Surgeon(s):  Freddie Wang MD    Assistant:   * No surgical staff found *    Anesthesia: Monitor Anesthesia Care    Estimated Blood Loss (mL): Minimal    Complications: None    Specimens:   ID Type Source Tests Collected by Time Destination   A :  RIGHT MID-BACK EPIDERMAL CYST  Tissue Tissue SURGICAL PATHOLOGY Freddie Wang MD 8/23/2021 0745        Implants:  * No implants in log *      Drains: * No LDAs found *    Findings: Chronically inflamed epidermal cyst, 2 x 1 cm    Detailed Description of Procedure:   Patient was placed in left lateral decubitus position left side down right side up. Right mid back was prepped and draped. Lidocaine 1% epinephrine was injected intradermally and subcutaneously around the cyst.  Elliptical incision was made around the cyst with a scalpel. Cyst was then excised using electrocautery. Supplemental oxygen was turned off prior to using the cautery. Wound was irrigated with saline and hemostasis obtained use electrocautery. Supplemental oxygen was turned off prior to using cautery. Skin was approximate interrupted 4-0 Vicryl subdermal stitches and closed running 4-0 Vicryl subcuticular stitch. Benzoin Steri-Strips sterile dressings applied. Lidocaine patch was placed over top of the sterile dressing. Patient are procedure well and sent to PACU in stable condition with plans to discharge later today and follow-up in the office for postop check in 2 weeks.     Electronically signed by Freddie Wang MD on 8/23/2021 at 8:07 AM

## 2021-09-02 ENCOUNTER — OFFICE VISIT (OUTPATIENT)
Dept: SURGERY | Age: 41
End: 2021-09-02
Payer: MEDICAID

## 2021-09-02 VITALS
TEMPERATURE: 97 F | SYSTOLIC BLOOD PRESSURE: 126 MMHG | WEIGHT: 265 LBS | HEART RATE: 81 BPM | DIASTOLIC BLOOD PRESSURE: 85 MMHG | OXYGEN SATURATION: 94 % | BODY MASS INDEX: 32.95 KG/M2 | HEIGHT: 75 IN | RESPIRATION RATE: 16 BRPM

## 2021-09-02 DIAGNOSIS — Z09 POSTOPERATIVE EXAMINATION: Primary | ICD-10-CM

## 2021-09-02 PROCEDURE — 99024 POSTOP FOLLOW-UP VISIT: CPT | Performed by: SURGERY

## 2021-09-02 PROCEDURE — 99212 OFFICE O/P EST SF 10 MIN: CPT | Performed by: SURGERY

## 2021-09-02 NOTE — PROGRESS NOTES
Postop Progress Note    Subjective    Geneelvis Mater presents to the office for postop follow up. Objective    Vitals:    09/02/21 1305   BP: 126/85   Pulse: 81   Resp: 16   Temp: 97 °F (36.1 °C)   SpO2: 94%     General: alert, cooperative and no distress  Incision: healing well    Assessment  Doing well postoperatively. Plan  1. Continue any current medications  2. Wound care discussed  3. Pt is to increase activities as tolerated  4. Usual diet  5.  Follow up: as needed    Electronically signed by Leroy Hsieh MD on 9/2/2021 at 1:19 PM

## 2021-10-01 ENCOUNTER — HOSPITAL ENCOUNTER (EMERGENCY)
Age: 41
Discharge: HOME | End: 2021-10-01
Payer: MEDICAID

## 2021-10-01 VITALS
HEIGHT: 75 IN | BODY MASS INDEX: 34.19 KG/M2 | HEART RATE: 82 BPM | WEIGHT: 275 LBS | SYSTOLIC BLOOD PRESSURE: 128 MMHG | TEMPERATURE: 98.1 F | RESPIRATION RATE: 16 BRPM | OXYGEN SATURATION: 100 % | DIASTOLIC BLOOD PRESSURE: 78 MMHG

## 2021-10-01 DIAGNOSIS — L02.419 CELLULITIS AND ABSCESS OF LEG: Primary | ICD-10-CM

## 2021-10-01 DIAGNOSIS — L03.119 CELLULITIS AND ABSCESS OF LEG: Primary | ICD-10-CM

## 2021-10-01 PROCEDURE — 99283 EMERGENCY DEPT VISIT LOW MDM: CPT

## 2021-10-01 PROCEDURE — 10060 I&D ABSCESS SIMPLE/SINGLE: CPT

## 2021-10-01 RX ORDER — CEFDINIR 300 MG/1
300 CAPSULE ORAL 2 TIMES DAILY
Qty: 20 CAPSULE | Refills: 0 | Status: SHIPPED | OUTPATIENT
Start: 2021-10-01 | End: 2021-10-11

## 2021-10-01 RX ORDER — DOXYCYCLINE HYCLATE 100 MG
100 TABLET ORAL 2 TIMES DAILY
Qty: 20 TABLET | Refills: 0 | Status: SHIPPED | OUTPATIENT
Start: 2021-10-01 | End: 2021-10-11

## 2021-10-01 ASSESSMENT — PAIN DESCRIPTION - LOCATION: LOCATION: LEG

## 2021-10-01 ASSESSMENT — PAIN DESCRIPTION - ORIENTATION: ORIENTATION: RIGHT

## 2021-10-01 ASSESSMENT — PAIN SCALES - GENERAL: PAINLEVEL_OUTOF10: 8

## 2021-10-01 ASSESSMENT — PAIN DESCRIPTION - DESCRIPTORS: DESCRIPTORS: ACHING

## 2021-10-01 ASSESSMENT — PAIN DESCRIPTION - PAIN TYPE: TYPE: ACUTE PAIN

## 2021-10-01 NOTE — ED PROVIDER NOTES
display       ------------------------- NURSING NOTES AND VITALS REVIEWED ---------------------------   The nursing notes within the ED encounter and vital signs as below have been reviewed. /78   Pulse 82   Temp 98.1 °F (36.7 °C) (Tympanic)   Resp 16   Ht 6' 3\" (1.905 m)   Wt 275 lb (124.7 kg)   SpO2 100%   BMI 34.37 kg/m²   Oxygen Saturation Interpretation: Normal      ---------------------------------------------------PHYSICAL EXAM--------------------------------------      Constitutional/General: Alert and oriented x3, well appearing, non toxic in NAD  Head: Normocephalic and atraumatic  Eyes: PERRL, EOMI  Mouth: Oropharynx clear  Neck: Supple, full ROM  Pulmonary: Lungs clear to auscultation bilaterally. Not in respiratory distress  Cardiovascular:  Regular rate and rhythm. 2+ distal pulses  Abdomen: Soft, non tender, non distended,   Extremities: Moves all extremities x 4. Right lateral lower leg noted some small scabs distally and mid to proximal aspect noted large area with surrounding erythema consistent with local abscess. Most central area apparently 2cm in total size with induration with some surrounding cellulitis noted. With suppression of open area , able to suppress some blood and pus from area. Warm and well perfused  Skin: warm and dry without rash  Neurologic: GCS 15  Psych: Normal Affect    PROCEDURE  10/1/21           INCISION AND DRAINAGE  Risks, benefits and alternatives (for applicable procedures below) described. Performed By: Naomi Parish PA-C. Indication: Abscess located on right lateral calf  Informed consent obtained: The patient was counseled regarding the procedure, it's indications, risks, potential complications and alternatives and any questions were answered. Consent was obtained. .  Prep: The skin was cleansed with povidone iodine and draped in a sterile fashion. Local Anesthesia:  obtained with Lidocaine 1% without epinephrine.   Incision: The Abscess located on right lateral calf was Incised by scalpal and copious, serosanguinous fluid was drained. A wound culture was not obtained. The wound  was not irrigated and was packed with iodoform gauze. The wound was then covered with a sterile dressing. Patient tolerated the procedure well. Sterile dressing was placed, patient tolerated well         ------------------------------ ED COURSE/MEDICAL DECISION MAKING----------------------  Medications - No data to display      ED COURSE:       Medical Decision Making:    Patient to ER, complaints of abscess to his right lateral lower leg, onset over the last 1 week or more. Patient reports had multiple bee stings to right lower leg prior. Patient underwent I&D of abcsess as noted above. Tetanus is UTD. Patient given Oral Doxycycline and Omnicef- patient lists PCN allergy, but has had Ancef in the past.  Patient advised of need to try and keep packing intact and to keep wound area clean and dry. Recommend to have wound checked in a few days and to have packing removed if it does not fall out. May need to see general surgery for further debridement if not resolving. If any worsening with increased redness, high fever, shaking chills, worsening swelling or any changes, return to ER immediately. Counseling: The emergency provider has spoken with the patient and discussed todays results, in addition to providing specific details for the plan of care and counseling regarding the diagnosis and prognosis. Questions are answered at this time and they are agreeable with the plan.      --------------------------------- IMPRESSION AND DISPOSITION ---------------------------------    IMPRESSION  1. Cellulitis and abscess of leg        DISPOSITION  Disposition: Discharge to home  Patient condition is stable      NOTE: This report was transcribed using voice recognition software.  Every effort was made to ensure accuracy; however, inadvertent computerized transcription errors may be present       Ishan Ledesma PA-C  10/02/21 4593

## 2021-10-02 VITALS
SYSTOLIC BLOOD PRESSURE: 147 MMHG | DIASTOLIC BLOOD PRESSURE: 86 MMHG | BODY MASS INDEX: 34.19 KG/M2 | HEART RATE: 79 BPM | TEMPERATURE: 97.8 F | WEIGHT: 275 LBS | OXYGEN SATURATION: 96 % | HEIGHT: 75 IN | RESPIRATION RATE: 16 BRPM

## 2021-10-02 PROCEDURE — 99283 EMERGENCY DEPT VISIT LOW MDM: CPT

## 2021-10-02 RX ORDER — HYDROCODONE BITARTRATE AND ACETAMINOPHEN 5; 325 MG/1; MG/1
1 TABLET ORAL ONCE
Status: COMPLETED | OUTPATIENT
Start: 2021-10-03 | End: 2021-10-03

## 2021-10-02 ASSESSMENT — PAIN DESCRIPTION - ORIENTATION: ORIENTATION: RIGHT;LOWER

## 2021-10-02 ASSESSMENT — PAIN SCALES - GENERAL: PAINLEVEL_OUTOF10: 9

## 2021-10-02 ASSESSMENT — PAIN DESCRIPTION - PAIN TYPE: TYPE: ACUTE PAIN

## 2021-10-02 ASSESSMENT — PAIN DESCRIPTION - LOCATION: LOCATION: LEG

## 2021-10-03 ENCOUNTER — HOSPITAL ENCOUNTER (EMERGENCY)
Age: 41
Discharge: HOME | End: 2021-10-03
Payer: MEDICAID

## 2021-10-03 DIAGNOSIS — L03.115 CELLULITIS OF RIGHT LOWER LEG: Primary | ICD-10-CM

## 2021-10-03 PROCEDURE — 6370000000 HC RX 637 (ALT 250 FOR IP): Performed by: NURSE PRACTITIONER

## 2021-10-03 RX ORDER — HYDROCODONE BITARTRATE AND ACETAMINOPHEN 5; 325 MG/1; MG/1
1 TABLET ORAL ONCE
Status: DISCONTINUED | OUTPATIENT
Start: 2021-10-03 | End: 2021-10-03

## 2021-10-03 RX ORDER — HYDROCODONE BITARTRATE AND ACETAMINOPHEN 5; 325 MG/1; MG/1
1 TABLET ORAL EVERY 8 HOURS PRN
Qty: 6 TABLET | Refills: 0 | Status: SHIPPED | OUTPATIENT
Start: 2021-10-03 | End: 2021-10-05

## 2021-10-03 RX ADMIN — HYDROCODONE BITARTRATE AND ACETAMINOPHEN 1 TABLET: 5; 325 TABLET ORAL at 00:28

## 2021-10-03 ASSESSMENT — PAIN SCALES - GENERAL: PAINLEVEL_OUTOF10: 9

## 2021-10-03 NOTE — ED PROVIDER NOTES
ED Attending shared visit  CC: No         [facility]  Department of Emergency Medicine   ED  Encounter Note  Admit Date/RoomTime: No admission date for patient encounter. ED Room: Room/bed info not found    NAME: Mario Benjamin  : 1980  MRN: [id number]     Chief Complaint:  Leg Pain (had abscess drained on 10/1, changed dressing today now w increased swelling and pain)    History of Present Illness        Mario Benjamin is a 41 y.o. old male who presents to the emergency department by private vehicle with spouse for right anterior lateral leg pain and swelling and states he was bit by a bee several weeks prior and he developed an abscess and went to [facility] ED and had I&D of the abscess yesterday and was placed on doxycycline and Omnicef for one day and does not feel improvement and feels the pain is worse. He did not take anything for pain. He denies any fever, chills, nausea, vomiting pain with movement of the knee. Patient is concerned about compartment syndrome because has had swelling. Tetanus Status:  Up to date per patient. ROS   Pertinent positives and negatives are stated within HPI, all other systems reviewed and are negative. Past Medical History:  has a past medical history of Anxiety and depression, Diabetes mellitus type 2, noninsulin dependent ([address] GERD (gastroesophageal reflux disease), Hyperlipidemia, and Prediabetes. Surgical History:  has a past surgical history that includes cyst removal (2021) and Abdomen surgery (Right, 2021). Social History:  reports that he has never smoked. He has never used smokeless tobacco. He reports that he does not drink alcohol and does not use drugs. Family History: family history is not on file.      Allergies: Pcn [penicillins]    Physical Exam   Oxygen Saturation Interpretation: Normal.        ED Triage Vitals [10/02/21 2346]   BP Temp Temp Source Pulse Resp SpO2 Height Weight   (!) 147/86 97.8 °F (36.6 °C) Temporal 79 16 96 % 6' 3\" (1.905 m) 275 lb (124.7 kg)         Constitutional:  Alert, development consistent with age. HEENT:  NC/NT. Airway patent. Neck:  Normal ROM. Supple. Respiratory:  Clear to auscultation and breath sounds equal.  CV:  Regular rate and rhythm, normal heart sounds, without pathological murmurs, ectopy, gallops, or rubs. GI:  Abdomen Soft, nontender, good bowel sounds. No firm or pulsatile mass. Back:  No costovertebral tenderness. Extremities: No tenderness or edema noted. Right lower leg see photo below. Full range of motion of right knee with no pain. 2+ pedal and posterior tibial pulses intact. Capillary refill less than 3 seconds in distal toes on the left. Compartments all soft and compressible. Integument:  Normal turgor. Warm, dry, without visible rash, unless noted elsewhere. Lymphatics: No lymphangitis or adenopathy noted. Neurological:  Oriented. Motor functions intact. **Informed Consent**    The patient has given verbal consent to have photos taken of right lower leg and electronically inserted into their ED Provider Note as part of their permanent medical record for purposes of illustration, documentation, treatment management and/or medical review. All Images taken on 10/3/21 of patient name: Indra Martinez were taken by a [facility] approved registered mobile device via [employer] mobile application and transmitted then stored on a secured [employer] [facility] located within [facility]. Right lateral mid tibia area with wound packing and area of 4 cm diameter erythema. No drainage noted. Lab / Imaging Results   (All laboratory and radiology results have been personally reviewed by myself)  Labs:  No results found for this visit on 10/02/21. Imaging: All Radiology results interpreted by Radiologist unless otherwise noted.   No orders to display     ED Course / Medical Decision & alternative treatments discussed. ;No signs of potential drug abuse or diversion identified. Plan of Care/Counseling:  MUSA Knight CNP and EM Attending Physician reviewed today's visit with the patient and spouse / life partner in addition to providing specific details for the plan of care and counseling regarding the diagnosis and prognosis. Questions are answered at this time and are agreeable with the plan. Assessment     1. Cellulitis of right lower leg      Plan   Discharged home. Patient condition is good    New Medications     New Prescriptions    HYDROCODONE-ACETAMINOPHEN (NORCO) 5-325 MG PER TABLET    Take 1 tablet by mouth every 8 hours as needed for Pain for up to 2 days. Electronically signed by MUSA Knight CNP   DD: 10/2/21  **This report was transcribed using voice recognition software. Every effort was made to ensure accuracy; however, inadvertent computerized transcription errors may be present.   END OF ED PROVIDER NOTE     MUSA Knight CNP  10/03/21 5560

## 2021-10-04 ENCOUNTER — TELEPHONE (OUTPATIENT)
Dept: SURGERY | Age: 41
End: 2021-10-04

## 2021-10-04 NOTE — TELEPHONE ENCOUNTER
GRACE Oakes received a call from patient wanting to schedule an appointment. MA scheduled pt for 10/14/2021 @ 3:45pm with  in [facility] clinic. Patient verbalized appointment date, time and location. MA verified number that was good to do reminder call on was the one listed in chart. MA advised patient where to park and enter in the building for the appointment.     Electronically signed by Edie Hickey MA on 10/4/21 at 2:16 PM EDT

## 2021-10-14 ENCOUNTER — OFFICE VISIT (OUTPATIENT)
Dept: SURGERY | Age: 41
End: 2021-10-14
Payer: MEDICAID

## 2021-10-14 VITALS
OXYGEN SATURATION: 95 % | WEIGHT: 275 LBS | HEIGHT: 72 IN | SYSTOLIC BLOOD PRESSURE: 121 MMHG | BODY MASS INDEX: 37.25 KG/M2 | HEART RATE: 80 BPM | RESPIRATION RATE: 16 BRPM | DIASTOLIC BLOOD PRESSURE: 82 MMHG | TEMPERATURE: 97 F

## 2021-10-14 DIAGNOSIS — T14.8XXA OPEN WOUND: ICD-10-CM

## 2021-10-14 DIAGNOSIS — L03.90 CELLULITIS, UNSPECIFIED CELLULITIS SITE: Primary | ICD-10-CM

## 2021-10-14 DIAGNOSIS — E11.9 DIABETES MELLITUS TYPE 2, NONINSULIN DEPENDENT (HCC): Chronic | ICD-10-CM

## 2021-10-14 PROCEDURE — G8484 FLU IMMUNIZE NO ADMIN: HCPCS | Performed by: SURGERY

## 2021-10-14 PROCEDURE — 1036F TOBACCO NON-USER: CPT | Performed by: SURGERY

## 2021-10-14 PROCEDURE — 3046F HEMOGLOBIN A1C LEVEL >9.0%: CPT | Performed by: SURGERY

## 2021-10-14 PROCEDURE — 99212 OFFICE O/P EST SF 10 MIN: CPT | Performed by: SURGERY

## 2021-10-14 PROCEDURE — 2022F DILAT RTA XM EVC RTNOPTHY: CPT | Performed by: SURGERY

## 2021-10-14 PROCEDURE — G8427 DOCREV CUR MEDS BY ELIG CLIN: HCPCS | Performed by: SURGERY

## 2021-10-14 PROCEDURE — G8417 CALC BMI ABV UP PARAM F/U: HCPCS | Performed by: SURGERY

## 2021-10-14 NOTE — PATIENT INSTRUCTIONS
Dr. Aleena Liz recommended clean wound on right lower leg with antibacterial soap and water and apply antibiotic ointment and dry sterile dressing three times a day. Also, apply heat to area of the wound for 30 to 45 minutes 3 to 4 times a day.

## 2021-10-14 NOTE — PROGRESS NOTES
GENERAL SURGERY  HISTORY AND PHYSICAL  10/14/2021    Chief Complaint   Patient presents with    Wound Check     pt is here to follow up for abscess/cellulitis on right lower leg, pt finished antibiotics this past sunday. pt sates that the area is still swollen and tender but not draining anymore       ALVAREZ Brian is a 41 y.o. male who presents for evaluation of right ankle abscess. The patient states that 2 weeks ago he was stung by a bee at this site and he has been picking at the area ever since. He was seen at the [facility] ED, where I&D was done, and a 10 day course of antibiotics were prescribed. He was then seen at the [facility], where an ultrasound was done. He states that he has had several similar lesions on his arms over the past few years which were due to spider bites. He denies recreational drug or needle use at home. No fevers, chills, nausea, vomiting, shortness of breath, diarrhea, or abdominal pain. Past Medical History:   Diagnosis Date    Anxiety and depression     Diabetes mellitus type 2, noninsulin dependent (HCC)     GERD (gastroesophageal reflux disease)     Hyperlipidemia     Prediabetes        Past Surgical History:   Procedure Laterality Date    ABDOMEN SURGERY Right 8/23/2021    EXCISION OF EPIDERMAL CYST RIGHT MID BACK performed by Rita Barraza MD at [address]  08/23/2021    2 x 1 cm chronically inflamed epidermal cyst right mid back removed, Dr. Micheline Malcolm, [facility]       Prior to Admission medications    Medication Sig Start Date End Date Taking? Authorizing Provider   lidocaine 4 % external patch Apply 1 patch topically every 12 hours Apply to operative area daily.  8/23/21  Yes Rita Barraza MD   ALBUTEROL SULFATE IN Inhale into the lungs   Yes Historical Provider, MD   TRAZODONE HCL PO Take 50 mg by mouth nightly    Yes Historical Provider, MD   METOPROLOL SUCCINATE PO Take by mouth   Yes Historical Provider, MD   SERTRALINE HCL PO Take 150 mg by mouth    Yes Historical Provider, MD   METFORMIN HCL PO Take 500 mg by mouth daily    Yes Historical Provider, MD   montelukast (SINGULAIR) 10 MG tablet Take 10 mg by mouth nightly   Yes Historical Provider, MD   atorvastatin (LIPITOR) 10 MG tablet Take 10 mg by mouth daily   Yes Historical Provider, MD   omeprazole (PRILOSEC) 40 MG delayed release capsule Take 40 mg by mouth daily   Yes Historical Provider, MD   ibuprofen (ADVIL;MOTRIN) 800 MG tablet Take 1 tablet by mouth every 6 hours as needed for Pain 7/16/21 10/14/21 Yes MUSA Morton - CNP   acetaminophen (TYLENOL) 500 MG tablet Take 2 tablets by mouth every 8 hours for 7 days, THEN 2 tablets every 12 hours for 7 days, THEN 2 tablets every 8 hours as needed for Pain. 8/23/21 9/11/21  Colleen Brito MD       Allergies   Allergen Reactions    Pcn [Penicillins] Swelling       History reviewed. No pertinent family history. Social History     Tobacco Use    Smoking status: Never Smoker    Smokeless tobacco: Never Used   Vaping Use    Vaping Use: Never used   Substance Use Topics    Alcohol use: Never     Comment: rare    Drug use: Never         Review of Systems   Negative except as listed in the HPI       PHYSICAL EXAM:    Vitals:    10/14/21 1549   BP: 121/82   Pulse: 80   Resp: 16   Temp: 97 °F (36.1 °C)   SpO2: 95%       General Appearance:  awake, alert, oriented, in no acute distress  Skin:  Skin color, texture, turgor normal. No rashes or lesions. Head/face:  NCAT  Eyes:  No gross abnormalities. Sclera nonicteric  Lungs/Chest:  Normal expansion. No chest wall tenderness  Cardiovascular: Warm throughout. No chest pain  Abdomen:  Soft, non tender. Extremities: Extremities warm to touch, with no edema. 8mm lesion to lateral right ankle which appears to be a scabbed over chronic wound. 5cm surrounding induration. No erythema, drainage, or tenderness.       LABS:  CBC  No results for input(s): WBC, HGB, HCT, PLT in the last 72 hours.  BMP  No results for input(s): NA, K, CL, CO2, BUN, CREATININE, CALCIUM in the last 72 hours. Invalid input(s): GLU  Liver Function  No results for input(s): AMYLASE, LIPASE, BILITOT, BILIDIR, AST, ALT, ALKPHOS, PROT, LABALBU in the last 72 hours. No results for input(s): LACTATE in the last 72 hours. No results for input(s): INR, PTT in the last 72 hours. Invalid input(s): PT    RADIOLOGY    No results found. ASSESSMENT:  41 y.o. male with 3week old wound of the lateral right ankle. PLAN:  - patient instructed regarding wound care with antibacterial soap, ointment, and clean dressing changes daily  - patient advised to stop picking at this wound  - no acute surgical intervention indicated at this time    Plan discussed with Dr. Rashmi Recio    Electronically signed by Kiera Grissom DO on 10/14/21 at 4:25 PM EDT    Attending Physician Note  Chief Complaint   Patient presents with    Wound Check     pt is here to follow up for abscess/cellulitis on right lower leg, pt finished antibiotics this past sunday. pt sates that the area is still swollen and tender but not draining anymore     History:  The patient is 41 y.o. male. I discussed the case with the resident and I saw and examined the patient with the resident. I reviewed the resident's note and made corrections as appropriate. I reviewed the patient's Past Medical History, Past Surgical History, Medications, and Allergies. Physical Exam:  Vitals:    10/14/21 1549   BP: 121/82   Site: Left Upper Arm   Position: Sitting   Cuff Size: Large Adult   Pulse: 80   Resp: 16   Temp: 97 °F (36.1 °C)   TempSrc: Infrared   SpO2: 95%   Weight: 275 lb (124.7 kg)   Height: 6' (1.829 m)       Body mass index is 37.3 kg/m². Extremities: there was a small (10 x 5 mm) chronic open wound in the lateral aspect the right lower leg with mild swelling around this area. There is no signs of acute infection or abscess.     Impression/Plan:  I reviewed the resident's impression and plan and made corrections as appropriate. 1. Chronic nonhealing wound right lower extremity - I recommend local wound care with cleaning the wound with antibacterial soap and water and applying topical antibiotic and dry sterile dressing 3 times a day. I also recommend applying heat therapy 30 to 45 min 3-4 times a day. Patient is to follow-up in the office in 2 weeks. If the wound continues to be nonhealing, may need to have this area excised. Patient understands and agreement. 2. Type II, non-insulin-dependent, diabetes mellitus without complications  3. Hyperlipidemia - on medications for this  4. GERD  5.  Anxiety and depression - on medications for this    Electronically by Kacie Bonilla MD  on 10/14/21 at 4:56 PM EDT

## 2021-10-28 ENCOUNTER — OFFICE VISIT (OUTPATIENT)
Dept: SURGERY | Age: 41
End: 2021-10-28
Payer: MEDICAID

## 2021-10-28 VITALS
OXYGEN SATURATION: 97 % | HEIGHT: 72 IN | SYSTOLIC BLOOD PRESSURE: 128 MMHG | WEIGHT: 275 LBS | TEMPERATURE: 97.9 F | BODY MASS INDEX: 37.25 KG/M2 | DIASTOLIC BLOOD PRESSURE: 83 MMHG | RESPIRATION RATE: 12 BRPM | HEART RATE: 73 BPM

## 2021-10-28 DIAGNOSIS — T14.8XXA OPEN WOUND: Primary | ICD-10-CM

## 2021-10-28 DIAGNOSIS — E78.5 HYPERLIPIDEMIA, UNSPECIFIED HYPERLIPIDEMIA TYPE: Chronic | ICD-10-CM

## 2021-10-28 DIAGNOSIS — E11.9 DIABETES MELLITUS TYPE 2, NONINSULIN DEPENDENT (HCC): Chronic | ICD-10-CM

## 2021-10-28 PROCEDURE — G8484 FLU IMMUNIZE NO ADMIN: HCPCS | Performed by: SURGERY

## 2021-10-28 PROCEDURE — G8417 CALC BMI ABV UP PARAM F/U: HCPCS | Performed by: SURGERY

## 2021-10-28 PROCEDURE — 1036F TOBACCO NON-USER: CPT | Performed by: SURGERY

## 2021-10-28 PROCEDURE — 99212 OFFICE O/P EST SF 10 MIN: CPT | Performed by: SURGERY

## 2021-10-28 PROCEDURE — G8427 DOCREV CUR MEDS BY ELIG CLIN: HCPCS | Performed by: SURGERY

## 2021-10-28 PROCEDURE — 3046F HEMOGLOBIN A1C LEVEL >9.0%: CPT | Performed by: SURGERY

## 2021-10-28 PROCEDURE — 2022F DILAT RTA XM EVC RTNOPTHY: CPT | Performed by: SURGERY

## 2021-10-28 RX ORDER — SERTRALINE HYDROCHLORIDE 100 MG/1
TABLET, FILM COATED ORAL
COMMUNITY
Start: 2021-10-25

## 2021-10-28 RX ORDER — METOPROLOL SUCCINATE 25 MG/1
TABLET, EXTENDED RELEASE ORAL
COMMUNITY
Start: 2021-09-25

## 2021-10-28 RX ORDER — TRAZODONE HYDROCHLORIDE 50 MG/1
TABLET ORAL
COMMUNITY
Start: 2021-10-22

## 2021-10-28 RX ORDER — ALBUTEROL SULFATE 90 UG/1
AEROSOL, METERED RESPIRATORY (INHALATION)
COMMUNITY
Start: 2021-10-12

## 2021-10-28 NOTE — PATIENT INSTRUCTIONS
Continue to clean incisions with antibacterial soap and water and apply antibiotic ointment and dry sterile dressing 1-2 times a day. Any questions or concerns, please contact my medical assistant Enrique Duval at [phone].

## 2021-10-28 NOTE — PROGRESS NOTES
General Surgery Progress Note  Date: 10/28/2021     ASSESSMENT/PLAN:  1. Chronic healing wound right lower extremity -recommend continue local wound care at least twice a day and keep it covered until the scab comes off and it heals over. Patient to follow-up me as needed. 2. Type II, non-insulin-dependent, diabetes mellitus without complications  3. Hyperlipidemia - on medications for this  4. GERD  5. Anxiety and depression - on medications for this    Chief Complaint   Patient presents with    Wound Check     P/O wound check RLE. Patient states doing good no questions or concerns. History:  The patient is 35 y.o. male who I saw in the office 2 weeks ago on 10/14/2021 with a chronic wound in the right lower extremity. He stated that about 2 weeks prior to that he was stung by bee in the site and it subsequently became swollen red and tender. He was seen in the [address] [place] [address] ED on 10/1/2021 and incision and drainage was performed. He was discharged on 2 antibiotics, doxycycline and Omnicef. He was then seen in the [address] [place] [address] ED on 10/3/2021 because he did not feel the infection was improving and he was concerned about \"compartment syndrome\". He was discharged home with pain medicine with [place].    When I saw the patient the office on 10/14/2021, the wound seem to be getting smaller and was 10 x 5 mm. There is no acute infection or abscess. I recommended local wound care with cleaning with antibacterial soap and water and then apply topical antibiotic ointment and dry sterile dressing 3 times a day as well as apply heat to the area for 30 to 45 minutes 3-4 times a day. He was to follow-up in the office 2 weeks. Patient returns today for office follow-up. Wound is almost completely healed and is scabbed over. He denies any pain or tenderness at this time. He finishes antibiotics several weeks ago.       Wound nearly healed and savb over contiu wound rar RTO PRN    Medications and Allergies were reviewed    Physical Exam:  /83 (Site: Right Upper Arm, Position: Sitting, Cuff Size: Medium Adult)   Pulse 73   Temp 97.9 °F (36.6 °C) (Temporal)   Resp 12   Ht 6' (1.829 m)   Wt 275 lb (124.7 kg)   SpO2 97%   BMI 37.30 kg/m²     Extremities: There is a very small (3 to 5 mm) scabbed over wound in the lateral aspect the right lower leg with no significant surrounding cellulitis and no signs of any abscess.     Electronically by Colleen Brito MD  on 10/28/2021 at 3:19 PM

## 2021-10-28 NOTE — LETTER
[address]  [address], [place], [address]  [phone] (Fax)    October 28, 2021     Maranda Silva MD  [address] [place] [id number]    Patient: Carol Salazar   YOB: 1980   Date of Visit: 10/28/2021       Dear Maranda Silva:    Thank you for referring Richard Aquino to me for evaluation. Attached is my office note. If you have questions, please do not hesitate to call me. I look forward to following this patient along with you. Sincerely,    Electronically signed by Rufino Irizarry MD on 10/28/21 at 3:44 PM EDT                                                      General Surgery Progress Note  Date: 10/28/2021     ASSESSMENT/PLAN:  1. Chronic healing wound right lower extremity -recommend continue local wound care at least twice a day and keep it covered until the scab comes off and it heals over. Patient to follow-up me as needed. 2. Type II, non-insulin-dependent, diabetes mellitus without complications  3. Hyperlipidemia - on medications for this  4. GERD  5. Anxiety and depression - on medications for this    Chief Complaint   Patient presents with    Wound Check     P/O wound check RLE. Patient states doing good no questions or concerns. History:  The patient is 35 y.o. male who I saw in the office 2 weeks ago on 10/14/2021 with a chronic wound in the right lower extremity. He stated that about 2 weeks prior to that he was stung by bee in the site and it subsequently became swollen red and tender. He was seen in the [address] [place] [address] ED on 10/1/2021 and incision and drainage was performed. He was discharged on 2 antibiotics, doxycycline and Omnicef. He was then seen in the [address] [place] [address] at [facility] ED on 10/3/2021 because he did not feel the infection was improving and he was concerned about \"compartment syndrome\".   He was discharged home with pain medicine with [place].    When I saw the patient the office on 10/14/2021, the wound seem to be getting smaller and was 10 x 5 mm. There is no acute infection or abscess. I recommended local wound care with cleaning with antibacterial soap and water and then apply topical antibiotic ointment and dry sterile dressing 3 times a day as well as apply heat to the area for 30 to 45 minutes 3-4 times a day. He was to follow-up in the office 2 weeks. Patient returns today for office follow-up. Wound is almost completely healed and is scabbed over. He denies any pain or tenderness at this time. He finishes antibiotics several weeks ago. Wound nearly healed and savb over contiu wound rar RTO PRN    Medications and Allergies were reviewed    Physical Exam:  /83 (Site: Right Upper Arm, Position: Sitting, Cuff Size: Medium Adult)   Pulse 73   Temp 97.9 °F (36.6 °C) (Temporal)   Resp 12   Ht 6' (1.829 m)   Wt 275 lb (124.7 kg)   SpO2 97%   BMI 37.30 kg/m²     Extremities: There is a very small (3 to 5 mm) scabbed over wound in the lateral aspect the right lower leg with no significant surrounding cellulitis and no signs of any abscess.     Electronically by Glenys Chin MD  on 10/28/2021 at 3:19 PM

## 2022-02-27 ENCOUNTER — HOSPITAL ENCOUNTER (EMERGENCY)
Age: 42
Discharge: HOME | End: 2022-02-27
Payer: MEDICAID

## 2022-02-27 VITALS
HEART RATE: 101 BPM | BODY MASS INDEX: 34.66 KG/M2 | WEIGHT: 278.8 LBS | RESPIRATION RATE: 16 BRPM | SYSTOLIC BLOOD PRESSURE: 132 MMHG | OXYGEN SATURATION: 97 % | HEIGHT: 75 IN | DIASTOLIC BLOOD PRESSURE: 89 MMHG | TEMPERATURE: 97.9 F

## 2022-02-27 DIAGNOSIS — H01.00B BLEPHARITIS OF BOTH UPPER AND LOWER EYELID OF LEFT EYE, UNSPECIFIED TYPE: Primary | ICD-10-CM

## 2022-02-27 PROCEDURE — 99284 EMERGENCY DEPT VISIT MOD MDM: CPT

## 2022-02-27 PROCEDURE — 6370000000 HC RX 637 (ALT 250 FOR IP): Performed by: NURSE PRACTITIONER

## 2022-02-27 RX ORDER — TETRACAINE HYDROCHLORIDE 5 MG/ML
2 SOLUTION OPHTHALMIC ONCE
Status: COMPLETED | OUTPATIENT
Start: 2022-02-27 | End: 2022-02-27

## 2022-02-27 RX ORDER — AZITHROMYCIN 250 MG/1
TABLET, FILM COATED ORAL
Qty: 1 PACKET | Refills: 0 | Status: SHIPPED | OUTPATIENT
Start: 2022-02-27 | End: 2022-03-09

## 2022-02-27 RX ORDER — ERYTHROMYCIN 5 MG/G
OINTMENT OPHTHALMIC
Qty: 3.5 G | Refills: 0 | Status: SHIPPED | OUTPATIENT
Start: 2022-02-27 | End: 2022-03-09

## 2022-02-27 RX ADMIN — FLUORESCEIN SODIUM 1 MG: 0.6 STRIP OPHTHALMIC at 15:11

## 2022-02-27 RX ADMIN — TETRACAINE HYDROCHLORIDE 2 DROP: 5 SOLUTION OPHTHALMIC at 15:11

## 2022-02-27 ASSESSMENT — VISUAL ACUITY
OU: 20/15
OS: 20/25
OD: 20/20

## 2022-02-27 NOTE — ED PROVIDER NOTES
[facility]  Department of Emergency Medicine   ED  Encounter Note  Admit Date/RoomTime: 2022  2:36 PM  ED Room:     NAME: Gelacio Farrar  : 1980  MRN: [id number]     Chief Complaint:  Eye Pain (PATIENT STATES STARTED DRAINING ON TUESDAY MATTED WHEN HE WAKES UP STATES PIMPLES? ON BOTH EYELIDS  NO INJURY )    History of Present Illness        Gelacio Farrar is a 35 y.o. old male presenting to the emergency department by private vehicle, for non-traumatic persistent swelling to to left upper eye lid and left lower eye lid, which began several day(s) prior to arrival.  There has been no obvious mechanism causing complaint. Since onset his symptoms have been improving and mild in severity. Associated signs & symptoms of: yellow crusting to the eyelids. The patients tetanus status is up to date. He states that he does not wear contact lenses. Patient denies any direct injury or foreign body or trauma to the eye. Circumstances:    []  Contact Lens Use     []  Recent URI Sx's     []  Spontaneous Onset     []  Close Contact w/similar Sx's     []  Work Related     History of:     []   Glaucoma     []   Recent Eye Surgery     ROS   Pertinent positives and negatives are stated within HPI, all other systems reviewed and are negative. Past Medical History:  has a past medical history of Anxiety and depression, Diabetes mellitus type 2, noninsulin dependent ([address] GERD (gastroesophageal reflux disease), Hyperlipidemia, and Prediabetes. Surgical History:  has a past surgical history that includes cyst removal (2021) and Abdomen surgery (Right, 2021). Social History:  reports that he has never smoked. He has never used smokeless tobacco. He reports that he does not drink alcohol and does not use drugs. Family History: family history is not on file.      Allergies: Pcn [penicillins]    Physical Exam   Oxygen Saturation Interpretation:

## 2022-02-27 NOTE — Clinical Note
Carmelo Albert was seen and treated in our emergency department on 2/27/2022. He may return to work on 03/02/2022. If you have any questions or concerns, please don't hesitate to call.       Alyse Ferguson, MUSA - CNP

## 2022-02-27 NOTE — ED NOTES
Patient discharged to home states understanding of home instructions      David Najera RN  02/27/22 0222

## (undated) DEVICE — APPLICATOR PREP 26ML 0.7% IOD POVACRYLEX 74% ISO ALC ST

## (undated) DEVICE — SURGICAL PROCEDURE PACK BASIC

## (undated) DEVICE — 1.5L THIN WALL CAN: Brand: CRD

## (undated) DEVICE — 3M™ STERI-STRIP™ COMPOUND BENZOIN TINCTURE 40 BAGS/CARTON 4 CARTONS/CASE C1544: Brand: 3M™ STERI-STRIP™

## (undated) DEVICE — READY WET SKIN SCRUB TRAY-LF: Brand: MEDLINE INDUSTRIES, INC.

## (undated) DEVICE — SYRINGE MED 10ML POLYPR LUERSLIP TIP FLAT TOP W/O SFTY DISP

## (undated) DEVICE — GLOVE ORANGE PI 7 1/2   MSG9075

## (undated) DEVICE — STANDARD HYPODERMIC NEEDLE,ALUMINUM HUB: Brand: MONOJECT

## (undated) DEVICE — TRAY,SKIN SCRUB,DRY,W/GAUZE: Brand: MEDLINE

## (undated) DEVICE — ELECTRODE PT RET AD L9FT HI MOIST COND ADH HYDRGEL CORDED

## (undated) DEVICE — SHEET,DRAPE,53X77,STERILE: Brand: MEDLINE

## (undated) DEVICE — TOWEL,OR,DSP,ST,BLUE,STD,6/PK,12PK/CS: Brand: MEDLINE

## (undated) DEVICE — SET INST MINOR PROCEDURE